# Patient Record
Sex: MALE | Race: WHITE | Employment: STUDENT | URBAN - METROPOLITAN AREA
[De-identification: names, ages, dates, MRNs, and addresses within clinical notes are randomized per-mention and may not be internally consistent; named-entity substitution may affect disease eponyms.]

---
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
## 2025-01-06 ENCOUNTER — HOSPITAL ENCOUNTER (OUTPATIENT)
Age: 20
Discharge: HOME | End: 2025-01-06
Payer: COMMERCIAL

## 2025-01-06 ENCOUNTER — APPOINTMENT (OUTPATIENT)
Dept: GENERAL RADIOLOGY | Age: 20
End: 2025-01-06
Attending: NURSE PRACTITIONER
Payer: COMMERCIAL

## 2025-01-06 VITALS
SYSTOLIC BLOOD PRESSURE: 147 MMHG | TEMPERATURE: 98 F | OXYGEN SATURATION: 100 % | HEART RATE: 75 BPM | DIASTOLIC BLOOD PRESSURE: 86 MMHG | RESPIRATION RATE: 18 BRPM

## 2025-01-06 DIAGNOSIS — K59.00 CONSTIPATION, UNSPECIFIED CONSTIPATION TYPE: Primary | ICD-10-CM

## 2025-01-06 LAB
#MXD IC: 0.7 X10ˆ3/UL (ref 0.1–1)
BUN BLD-MCNC: 18 MG/DL (ref 7–18)
CHLORIDE BLD-SCNC: 103 MMOL/L (ref 98–112)
CLARITY UR: CLEAR
CO2 BLD-SCNC: 25 MMOL/L (ref 21–32)
COLOR UR: YELLOW
CREAT BLD-MCNC: 0.8 MG/DL
EGFRCR SERPLBLD CKD-EPI 2021: 131 ML/MIN/1.73M2 (ref 60–?)
GLUCOSE BLD-MCNC: 89 MG/DL (ref 70–99)
GLUCOSE UR STRIP-MCNC: NEGATIVE MG/DL
HCT VFR BLD AUTO: 50.1 %
HCT VFR BLD CALC: 55 %
HGB BLD-MCNC: 16.9 G/DL
HGB UR QL STRIP: NEGATIVE
ISTAT IONIZED CALCIUM FOR CHEM 8: 1.3 MMOL/L (ref 1.12–1.32)
LEUKOCYTE ESTERASE UR QL STRIP: NEGATIVE
LYMPHOCYTES # BLD AUTO: 2.6 X10ˆ3/UL (ref 1.5–5)
LYMPHOCYTES NFR BLD AUTO: 43 %
MCH RBC QN AUTO: 27.9 PG (ref 26–34)
MCHC RBC AUTO-ENTMCNC: 33.7 G/DL (ref 31–37)
MCV RBC AUTO: 82.7 FL (ref 80–100)
MIXED CELL %: 12.1 %
NEUTROPHILS # BLD AUTO: 2.7 X10ˆ3/UL (ref 1.5–7.7)
NEUTROPHILS NFR BLD AUTO: 44.9 %
NITRITE UR QL STRIP: NEGATIVE
PH UR STRIP: 7 [PH]
PLATELET # BLD AUTO: 261 X10ˆ3/UL (ref 150–450)
POTASSIUM BLD-SCNC: 4.1 MMOL/L (ref 3.6–5.1)
PROT UR STRIP-MCNC: NEGATIVE MG/DL
RBC # BLD AUTO: 6.06 X10ˆ6/UL
SODIUM BLD-SCNC: 143 MMOL/L (ref 136–145)
SP GR UR STRIP: 1.02
UROBILINOGEN UR STRIP-ACNC: <2 MG/DL
WBC # BLD AUTO: 6 X10ˆ3/UL (ref 4–11)

## 2025-01-06 PROCEDURE — S0119 ONDANSETRON 4 MG: HCPCS | Performed by: NURSE PRACTITIONER

## 2025-01-06 PROCEDURE — 80047 BASIC METABLC PNL IONIZED CA: CPT | Performed by: NURSE PRACTITIONER

## 2025-01-06 PROCEDURE — 85025 COMPLETE CBC W/AUTO DIFF WBC: CPT | Performed by: NURSE PRACTITIONER

## 2025-01-06 PROCEDURE — 99214 OFFICE O/P EST MOD 30 MIN: CPT | Performed by: NURSE PRACTITIONER

## 2025-01-06 PROCEDURE — 81002 URINALYSIS NONAUTO W/O SCOPE: CPT | Performed by: NURSE PRACTITIONER

## 2025-01-06 PROCEDURE — 74018 RADEX ABDOMEN 1 VIEW: CPT | Performed by: NURSE PRACTITIONER

## 2025-01-06 RX ORDER — ONDANSETRON 4 MG/1
4 TABLET, ORALLY DISINTEGRATING ORAL ONCE
Status: COMPLETED | OUTPATIENT
Start: 2025-01-06 | End: 2025-01-06

## 2025-01-06 RX ORDER — FAMOTIDINE 20 MG/1
20 TABLET, FILM COATED ORAL ONCE
Status: COMPLETED | OUTPATIENT
Start: 2025-01-06 | End: 2025-01-06

## 2025-01-06 RX ORDER — ONDANSETRON 4 MG/1
4 TABLET, ORALLY DISINTEGRATING ORAL EVERY 8 HOURS PRN
Qty: 21 TABLET | Refills: 0 | Status: SHIPPED | OUTPATIENT
Start: 2025-01-06 | End: 2025-01-13

## 2025-01-06 RX ORDER — DICYCLOMINE HYDROCHLORIDE 10 MG/5ML
20 SOLUTION ORAL ONCE
Status: COMPLETED | OUTPATIENT
Start: 2025-01-06 | End: 2025-01-06

## 2025-01-06 NOTE — ED PROVIDER NOTES
Patient Seen in: [facility]      History     Chief Complaint   Patient presents with    Abdominal Pain     Variety of GI symptoms - Entered by patient     Stated Complaint: Abdominal Pain - Variety of GI symptoms    Subjective: This is a 19-year-old male, past medical history of anxiety, migraines, seasonal allergies, presents to immediate care clinic for evaluation of abnormal bowel movements.  Patient feels like he is constipated.  States generalized abdominal discomfort which is mild.  Positive nausea without vomiting.  No urinary symptoms.  No back pain.  No fever, chills, fatigue.  Patient has not been taking anything over-the-counter for his symptoms.  He is well-appearing.  AOx4.  The history is provided by the patient.             Objective:     Past Medical History:    Migraines    Seasonal allergies              Past Surgical History:   Procedure Laterality Date    Mouth surgery proc unlisted      growth at 6mos inside mouth    [place] teeth removed                  Social History     Socioeconomic History    Marital status: Single   Tobacco Use    Smoking status: Never    Smokeless tobacco: Never   Vaping Use    Vaping status: Never Used   Substance and Sexual Activity    Alcohol use: Never    Drug use: Never              Review of Systems   Gastrointestinal:  Positive for abdominal pain and nausea.       Positive for stated complaint: Abdominal Pain - Variety of GI symptoms  Other systems are as noted in HPI.  Constitutional and vital signs reviewed.      All other systems reviewed and negative except as noted above.    Physical Exam     ED Triage Vitals [01/06/25 1730]   BP (!) 155/106   Pulse 65   Resp 20   Temp 98.3 °F (36.8 °C)   Temp src Oral   SpO2 100 %   O2 Device None (Room air)       Current Vitals:   Vital Signs  BP: 147/86  Pulse: 75  Resp: 18  Temp: 98.3 °F (36.8 °C)  Temp src: Oral    Oxygen Therapy  SpO2: 100 %  O2 Device: None (Room air)        Physical Exam  Constitutional:        General: He is not in acute distress.     Appearance: He is well-developed. He is not ill-appearing or toxic-appearing.   HENT:      Head: Normocephalic.      Right Ear: External ear normal.      Left Ear: External ear normal.      Nose: Nose normal.      Mouth/Throat:      Mouth: Mucous membranes are moist.      Pharynx: No oropharyngeal exudate or posterior oropharyngeal erythema.   Eyes:      General:         Right eye: No discharge.         Left eye: No discharge.      Extraocular Movements: Extraocular movements intact.      Pupils: Pupils are equal, round, and reactive to light.   Cardiovascular:      Rate and Rhythm: Normal rate and regular rhythm.      Pulses: Normal pulses.      Heart sounds: Normal heart sounds.   Pulmonary:      Effort: Pulmonary effort is normal.      Breath sounds: Normal breath sounds.   Abdominal:      General: Abdomen is flat. Bowel sounds are normal.      Palpations: Abdomen is soft.      Tenderness: There is no abdominal tenderness. There is no right CVA tenderness, left CVA tenderness, guarding or rebound.   Musculoskeletal:         General: Normal range of motion.      Cervical back: Normal range of motion.   Skin:     General: Skin is warm.      Capillary Refill: Capillary refill takes less than 2 seconds.   Neurological:      General: No focal deficit present.      Mental Status: He is alert and oriented to person, place, and time.             ED Course     Labs Reviewed   POCT CBC - Abnormal; Notable for the following components:       Result Value    RBC IC 6.06 (*)     All other components within normal limits   [facility] POCT URINALYSIS DIPSTICK - Abnormal; Notable for the following components:    Ketone, Urine Trace (*)     Bilirubin, Urine Small (*)     All other components within normal limits   POCT ISTAT CHEM8 CARTRIDGE - Abnormal; Notable for the following components:    ISTAT Hematocrit 55 (*)     All other components within normal limits        XR ABDOMEN (1 VIEW)  (CPT=74018)    Result Date: 1/6/2025  CONCLUSION: Normal examination.     Dictated by (CST): Gaurav Alba MD on 1/06/2025 at 7:24 PM     Finalized by (LUKASZ): Gaurav Alba MD on 1/06/2025 at 7:25 PM                    [facility]        Differentials considered include: Constipation, adverse effect of medication, IBS.    Patient is having abnormal bowel movements.  States variations of difficult bowel movements with \"hard rock like stools\" which vary from \"pasty\" stools..  Generalized abdominal discomfort but not found on exam.  No rebound or guarding.  No peritoneal signs.  No organomegaly.     Patient stools have been without blood, pus, mucus.    Positive nausea without vomiting.    Patient is not taking any over-the-counter education for symptoms.  Denies any fever, chills, fatigue.  No recent or current viral symptoms.    He does have a longstanding family history of IBS, Crohn's, ulcerative colitis.  Patient has never been formally diagnosed, nor has he seen a GI specialist.    Patient's workup included CBC, CHEM, urinalysis, abdominal x-ray.  However, patient was texting with his mom and is uncertain of abdominal x-ray.    Discussed patient that I do not believe any higher level of imaging is warranted based on symptoms, physical examination.  He has no symptoms to suggest diverticulitis, diverticulosis.  Low suspicion for acute intra-abdominal abnormality.    Patient was also given oral Zofran, oral Bentyl, oral Pepcid.    Patient CBC within normal limits.  No leukocytosis.  No anemia.  Chemistry within normal limits.  No MAREK.  Urine without evidence of infection, however trace ketones and small amount of bilirubin suggestive of mild mild dehydration.    Patient encouraged to drink more water.  He is also aware that this will help lubricate his bowels and facilitate bowel movements.    X-ray obtained.  X-ray without any evidence ofConstipation, ileus, bowel obstruction per radiologist.  Next  Patient is feeling  better after medications administered.  No longer nauseous.  No longer in discomfort.    Educated patient on importance of drinking at least 60 to 80 ounces of water.  He is aware of increased fiber in diet, sorbitol containing juices.  Also encouraged the use of over-the-counter Dulcolax and senna.    Patient is aware he can follow-up with GI specialist.    Educated patient on signs symptoms that warrant immediate ER evaluation.    Discussed case with supervising physician, Dr. Moran, agrees with workup, plan of care, strict ER precautions.    Patient's initial blood pressure was elevated upon arrival.  However, he is a very anxious person that comes to his medical wellbeing.  Vitals taken upon discharge and improved without medical intervention.    Medical Decision Making  Amount and/or Complexity of Data Reviewed  Labs: ordered.  Radiology: ordered.        Disposition and Plan     Clinical Impression:  1. Constipation, unspecified constipation type         Disposition:  Discharge  1/6/2025  7:26 pm    Follow-up:  Marycruz Gonzalez E, APRN  [address] [address]  [phone]    Schedule an appointment as soon as possible for a visit   This is a GI specialist that you can follow-up with.          Medications Prescribed:  Discharge Medication List as of 1/6/2025  7:30 PM        START taking these medications    Details   ondansetron 4 MG Oral Tablet Dispersible Take 1 tablet (4 mg total) by mouth every 8 (eight) hours as needed for Nausea., Normal, Disp-21 tablet, R-0                 Supplementary Documentation:

## 2025-01-06 NOTE — ED INITIAL ASSESSMENT (HPI)
Patient is here with have complaints of having frequent bowel movements on Thursday that were ball like stools.  He states he felt like he never really emptied his colon.  He states he had the same happen on Friday but in the morning only.  He then said on Saturday he had a pretty normal smooth long stool and then a few balls later in the day.  He states when he lays down he feels like he can't pass gas even though he is passing gas.  He states he is having slight pressure in his lower abdomen.

## 2025-01-07 NOTE — DISCHARGE INSTRUCTIONS
As discussed, blood work reassuring.  No elevation of your white blood cell count.  No anemia.  Your chemistry, sodium, potassium, kidney function, blood sugar is within normal limits.  Urine without evidence of infection, however, mild dehydration.  Please increase your water intake to at least 80 ounces of water/electrolyte replacement a day.    You may also eat a high fiber diet to facilitate bowel movements.  Sorbitol containing juices include: 100% prune juice, pear juice, apple juice.    I would also recommend an over-the-counter stool softener like Dulcolax which will soften your stool in addition to senna which is a laxative to help facilitate pushing the soften stool out.    I have given you the name of a GI specialist that you can follow-up with.    ZOFran as needed for nausea and vomiting    If you have any worsening abdominal pain, nausea, vomiting, diarrhea, fevers, please go to emergency room.

## (undated) NOTE — ED AVS SNAPSHOT
Parent/Legal Guardian Access to the Online [place] Record of a Patient 15to 16Years Old  Return completed form by Secure email to [place] HIM/Medical Records Department: [email or web address].     Requirements and Procedures   Under [facility] MyChart ID and password with another person, that person may be able to view my or my child’s health information, and health information about someone who has authorized me as a MyChart proxy.    ·  I agree that it is my responsibility to select a confident Sign-Up Form and I agree to its terms.        Authorization Form     Please enter Patient’s information below:   Name (last, first, middle initial) __________________________________________   Gender  Male  Female    Last 4 Digits of Social Security Number Parent/Legal Guardian Signature                                  For Patient (1517 years of age)  I agree to allow my parent/legal guardian, named above, online access to my medical information currently available and that may become available as a result

## (undated) NOTE — ED AVS SNAPSHOT
Parent/Legal Guardian Access to the Online [employer] Record of a Patient 15to 16Years Old  Return completed form by Secure email to [place] HIM/Medical Records Department: [email or web address]     Requirements and Procedures   Under [facility] MyChart ID and password with another person, that person may be able to view my or my child’s health information, and health information about someone who has authorized me as a MyChart proxy.    ·  I agree that it is my responsibility to select a confident Sign-Up Form and I agree to its terms.        Authorization Form     Please enter Patient’s information below:   Name (last, first, middle initial) __________________________________________   Gender  Male  Female    Last 4 Digits of Social Security Number Parent/Legal Guardian Signature                                  For Patient (1517 years of age)  I agree to allow my parent/legal guardian, named above, online access to my medical information currently available and that may become available as a result